# Patient Record
Sex: MALE | Race: BLACK OR AFRICAN AMERICAN | NOT HISPANIC OR LATINO | Employment: FULL TIME | RURAL
[De-identification: names, ages, dates, MRNs, and addresses within clinical notes are randomized per-mention and may not be internally consistent; named-entity substitution may affect disease eponyms.]

---

## 2022-03-12 ENCOUNTER — HOSPITAL ENCOUNTER (EMERGENCY)
Facility: HOSPITAL | Age: 57
Discharge: HOME | End: 2022-03-12

## 2022-03-12 VITALS
SYSTOLIC BLOOD PRESSURE: 185 MMHG | HEART RATE: 90 BPM | TEMPERATURE: 98 F | RESPIRATION RATE: 16 BRPM | OXYGEN SATURATION: 99 % | WEIGHT: 158 LBS | HEIGHT: 67 IN | DIASTOLIC BLOOD PRESSURE: 83 MMHG | BODY MASS INDEX: 24.8 KG/M2

## 2022-03-12 DIAGNOSIS — M77.10: Primary | ICD-10-CM

## 2022-03-12 DIAGNOSIS — M77.10 LATERAL EPICONDYLITIS, UNSPECIFIED LATERALITY: ICD-10-CM

## 2022-03-12 PROCEDURE — 99282 EMERGENCY DEPT VISIT SF MDM: CPT | Mod: ,,, | Performed by: NURSE PRACTITIONER

## 2022-03-12 PROCEDURE — 99283 EMERGENCY DEPT VISIT LOW MDM: CPT

## 2022-03-12 PROCEDURE — 99282 PR EMERGENCY DEPT VISIT,LEVEL II: ICD-10-PCS | Mod: ,,, | Performed by: NURSE PRACTITIONER

## 2022-03-12 NOTE — DISCHARGE INSTRUCTIONS
Purchase a tennis elbow sleeve in use as directed.  Follow-up with orthopedic surgery.  He should expect a phone call from their office this coming week.  If you have not heard from them by 03/15/2022 please call [phone] for appointment.

## 2022-03-12 NOTE — ED PROVIDER NOTES
Encounter Date: 3/12/2022       History     Chief Complaint   Patient presents with    Arm Pain     57-year-old male patient presents today with complaint of pain in his dominant right forearm.  Symptoms began insidiously over for 3-4 days ago.  It has gotten progressively worse.  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  States that today he has had difficulty and squeezing the nail gun due to discomfort.  Denies any injury or trauma.  Denies decreased sensation in the fingers forearm and hand.  Denies radiation of discomfort.        Review of patient's allergies indicates:  No Known Allergies  History reviewed. No pertinent past medical history.  History reviewed. No pertinent surgical history.  History reviewed. No pertinent family history.  Social History     Tobacco Use    Smoking status: Current Every Day Smoker     Types: Cigarettes    Smokeless tobacco: Never Used   Substance Use Topics    Alcohol use: Yes     Review of Systems   Constitutional: Positive for activity change. Negative for appetite change and fever.   Neurological: Negative for weakness.       Physical Exam     Initial Vitals   BP Pulse Resp Temp SpO2   03/12/22 0910 03/12/22 0908 03/12/22 0908 03/12/22 0908 03/12/22 0908   (!) 185/83 90 16 97.7 °F (36.5 °C) 99 %      MAP       --                Physical Exam    Constitutional: He appears well-developed and well-nourished.   HENT:   Head: Normocephalic and atraumatic.   Cardiovascular: Normal rate, regular rhythm, normal heart sounds and intact distal pulses.   Pulmonary/Chest: Breath sounds normal.   Musculoskeletal:      Comments: Pain reproducible with dorsiflexion of the hand against resistance.  Volar flexion against resistance does not produce pain.  Pain to palpation over the lateral epicondylar region.           Medical Screening Exam   See Full Note    ED Course   Procedures  Labs Reviewed - No data to display       Imaging Results           X-Ray Elbow 2 Views Right (In process)  Result time 03/12/22 09:26:53   Procedure changed from X-Ray Elbow Complete Right                  Medications - No data to display              ED Course as of 03/12/22 0956   Sat Mar 12, 2022   0954 X-ray reviewed.  No evidence of acute fracture, dislocation or pathologic bone. [CM]      ED Course User Index  [CM] ADITI Winkler          Clinical Impression:   Final diagnoses:  [M77.10] Tennis elbow syndrome (Primary)  [M77.10] Lateral epicondylitis, unspecified laterality          ED Disposition Condition    Discharge Stable        ED Prescriptions     None        Follow-up Information    None          ADITI Winkler  03/12/22 0953       ADITI Winkler  03/12/22 0956